# Patient Record
Sex: MALE | Race: OTHER | HISPANIC OR LATINO | URBAN - METROPOLITAN AREA
[De-identification: names, ages, dates, MRNs, and addresses within clinical notes are randomized per-mention and may not be internally consistent; named-entity substitution may affect disease eponyms.]

---

## 2024-03-11 ENCOUNTER — EMERGENCY (EMERGENCY)
Facility: HOSPITAL | Age: 24
LOS: 0 days | Discharge: HOME | End: 2024-03-11
Attending: EMERGENCY MEDICINE
Payer: COMMERCIAL

## 2024-03-11 VITALS
SYSTOLIC BLOOD PRESSURE: 146 MMHG | RESPIRATION RATE: 18 BRPM | TEMPERATURE: 98 F | DIASTOLIC BLOOD PRESSURE: 69 MMHG | HEART RATE: 77 BPM | OXYGEN SATURATION: 97 %

## 2024-03-11 VITALS
WEIGHT: 160.06 LBS | OXYGEN SATURATION: 98 % | HEART RATE: 83 BPM | RESPIRATION RATE: 18 BRPM | SYSTOLIC BLOOD PRESSURE: 134 MMHG | DIASTOLIC BLOOD PRESSURE: 72 MMHG | TEMPERATURE: 100 F | HEIGHT: 66 IN

## 2024-03-11 DIAGNOSIS — S02.401A MAXILLARY FRACTURE, UNSPECIFIED SIDE, INITIAL ENCOUNTER FOR CLOSED FRACTURE: ICD-10-CM
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
## 2024-03-11 DIAGNOSIS — J34.89 OTHER SPECIFIED DISORDERS OF NOSE AND NASAL SINUSES: ICD-10-CM
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
## 2024-03-11 DIAGNOSIS — R51.9 HEADACHE, UNSPECIFIED: ICD-10-CM

## 2024-03-11 PROCEDURE — 99284 EMERGENCY DEPT VISIT MOD MDM: CPT | Mod: 25

## 2024-03-11 PROCEDURE — 76376 3D RENDER W/INTRP POSTPROCES: CPT | Mod: 26

## 2024-03-11 PROCEDURE — 99284 EMERGENCY DEPT VISIT MOD MDM: CPT

## 2024-03-11 PROCEDURE — 70486 CT MAXILLOFACIAL W/O DYE: CPT | Mod: MC

## 2024-03-11 PROCEDURE — 70486 CT MAXILLOFACIAL W/O DYE: CPT | Mod: 26,MC

## 2024-03-11 PROCEDURE — 76376 3D RENDER W/INTRP POSTPROCES: CPT

## 2024-03-11 RX ORDER — ACETAMINOPHEN 500 MG
1000 TABLET ORAL ONCE
Refills: 0 | Status: COMPLETED | OUTPATIENT
Start: 2024-03-11 | End: 2024-03-11

## 2024-03-11 RX ADMIN — Medication 1000 MILLIGRAM(S): at 18:03

## 2024-03-11 NOTE — ED STATDOCS - PATIENT PORTAL LINK FT
You can access the FollowMyHealth Patient Portal offered by [facility] by registering at the following website: [email or web address]. By joining [facility] FollowMyHealth portal, you will also be able to view your health information using other applications (apps) compatible with our system.
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
## 2024-03-11 NOTE — ED STATDOCS - CARE PROVIDER_API CALL
Kendell Preciado  Oral/Maxillofacial Surgery  [address], [address]  [place] [address]  Phone: [phone]  Fax: [phone]  Follow Up Time:     Giuseppe Carpenter  Otolaryngology  [address], [address]  [place] [address]  Phone: [phone]  Fax: [phone]  Follow Up Time:
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
## 2024-03-11 NOTE — ED STATDOCS - NSFOLLOWUPINSTRUCTIONS_ED_ALL_ED_FT
Fractura maxilofacial  Maxillofacial Fracture  Jason fractura maxilofacial, también llamada fractura de la parte media de la luly, es la ruptura de los huesos de la parte media de la luly que polo la mandíbula superior (maxilar superior). Estos huesos incluyen:  El maxilar superior.  Los pómulos.  El borde inferior y el suelo de la cavidad ocular (suelo inferior u orbitario).  La abertura de las fosas nasales (cavidad nasal).  A veces, las fracturas maxilofaciales involucran múltiples huesos faciales, también llamadas fracturas complejas, y pueden desprenderse parcial o completamente del cráneo (fracturas de [place]). Las fracturas de [place] pueden ser muy graves y potencialmente mortales.    ¿Cuáles son las causas?  Las fracturas maxilofaciales suelen deberse a un traumatismo gabby y cerrado en la maycol media de la luly. Las causas pueden incluir las siguientes:  Accidentes automovilísticos.  Accidentes por deportes de contacto.  Deportes de combate o artes marciales.  Lesiones en deportes que utilizan pelotas duras o ramirez.  Caídas.  Accidentes en el lugar de trabajo.  Agresiones violentas.  ¿Cuáles son los signos o los síntomas?  Los síntomas de esta afección incluyen:  Hinchazón, moretones y dolor o sensibilidad en la luly.  Sangrado o coágulos de skinny en la nariz o la boca.  Drenaje transparente de la nariz.  Un cambio en el aspecto de la luly (deformidad facial) y entumecimiento.  Un cambio en la forma en que los dientes se unen (maloclusión).  Incapacidad para cerrar la boca.  Visión doble, visión borrosa o incapacidad para  el iron afectado normalmente.  Sangrado en el revestimiento del párpado o la maycol louie del iron (hemorragia subconjuntival).  Dificultad para respirar, tragar o hablar.  ¿Cómo se diagnostica?  Esta afección se puede diagnosticar en función de los síntomas y de un examen físico. El examen implica controlar lo siguiente:  Obstrucción de las vías respiratorias y cualquier sangrado potencialmente mortal.  Deformidad facial, aniya jason hendidura o depresión en la mandíbula superior que se puede sentir o , o ensanchamiento y aplanamiento de la luly.  Problemas de visión.  Entumecimiento o parálisis de los músculos oculares o faciales, o ambos.  Daño en los dientes y en el interior de la boca y la nariz.  También pueden hacerle estudios, aniya radiografías o exploración por tomografía computarizada (TC), para confirmar el diagnóstico y determinar la gravedad de la fractura.    ¿Cómo se trata?  Tratamiento temprano    El tratamiento depende de la gravedad de la fractura y de donde se encuentre. El tratamiento puede comenzar en la pa de emergencias para tener la seguridad de que los coágulos de skinny o la hinchazón no obstruyan la respiración. Los tratamientos pueden incluir lo siguiente:  Tubo endotraqueal. Se trata de un tubo de respiración que puede introducirse a través de la nariz hasta la tráquea.  Traqueostomía. Aminta procedimiento implica hacer jason abertura en la parte inferior de la tráquea para colocar un tubo de respiración.  Antibióticos, analgésicos y medicamentos para reducir la hinchazón.  Tratamientos específicos para el tipo de fractura.  Fijación maxilomandibular    En la mayoría de los casos, esta afección puede requerir un procedimiento para colocar alambres en los dientes superiores y los inferiores (fijación maxilomandibular). Es posible que necesite que le conecten los dientes mediante alambres luis felipe varias semanas para estabilizar la fractura luis felipe el tiempo de curación. Para fracturas menores, aminta puede ser el único tratamiento necesario.    Las fracturas que estén fuera de lugar (desplazadas) o inestables pueden requerir jason reducción abierta. Se trata de jason cirugía para volver a colocar los huesos rotos en hernandez lugar y luego sostenerlos con placas y tornillos o alambres.    Siga estas instrucciones en hernandez casa:  Medicamentos    Use los medicamentos de venta jeni y los recetados solamente aniya te lo haya indicado el médico.  Deville el antibiótico aniya se lo haya indicado el médico. No deje de daniel el antibiótico aunque comience a sentirse mejor.  Cuidado de la fijación maxilomandibular    Si se zavaleta unido los dientes con alambres:  Siga las instrucciones para cuidar la boca y los dientes (higiene bucal).  Asegúrese de saber qué hacer si vomita. Es posible que le den un cortador de alambre para cortar los alambres que se salgan de los dientes.  Siga las instrucciones del médico respecto de las restricciones en las comidas o las bebidas. Tendrá que seguir jason dieta con líquidos y purés mientras los dientes están unidos con alambres.  Cuidado de la incisión    Two stitched incisions. One is normal. The other is red with pus and infected.  Si tiene incisiones faciales, siga las instrucciones del médico acerca del cuidado de las incisiones. Asegúrese de hacer lo siguiente:  Lávese las rosa con agua y jabón luis felipe al menos 20 segundos antes y después de cambiarse la venda (vendaje). Use desinfectante para rosa si no dispone de agua y jabón.  Cambie el vendaje aniya se lo haya indicado el médico.  No retire los puntos (suturas), la goma para cerrar la piel o las tiras adhesivas. Es posible que estos cierres cutáneos deban quedar puestos en la piel luis felipe 2 semanas o más tiempo. Si los bordes de las tiras adhesivas empiezan a despegarse y enroscarse, puede recortar los que estén sueltos. No retire las tiras adhesivas por completo a menos que el médico se lo indique.  Controle la maycol de la incisión todos los días para detectar signos de infección. Esté atento a los siguientes signos:  Aumento del enrojecimiento, la hinchazón o el dolor.  Más líquido o skinyn.  Calor.  Pus o mal olor.  Control del dolor, la rigidez y la hinchazón    A bag of ice on a towel on the skin.   Si se lo indican, aplique hielo sobre la maycol afectada. Para hacer esto:  Ponga el hielo en jason bolsa plástica.  Coloque jason toalla entre la piel y la bolsa.  Aplique el hielo luis felipe 20 minutos, 2 a 3 veces por día.  Retire el hielo si la piel se pone de color gilbert brillante. [place] es muy importante. Si no puede sentir dolor, calor o frío, tiene un mayor riesgo de que se dañe la maycol.  Mientras esté sentado o acostado, levante (eleve) la astrid por encima del nivel del corazón.  Instrucciones generales    No tome remberto de inmersión, no nade ni use el jacuzzi hasta que el médico lo autorice. Pregúntele al médico si puede ducharse. Amor vez solo le permitan darse remberto de esponja.  Retome rafael actividades normales según lo indicado por el médico. Pregúntele al médico qué actividades son seguras para usted.  No consuma ningún producto que contenga nicotina o tabaco. Estos productos incluyen cigarrillos, tabaco para mascar y aparatos de vapeo, aniya los cigarrillos electrónicos. Si necesita ayuda para dejar de fumar, consulte al médico.  No martinez alcohol.  No levante ningún objeto que pese más de 10 libras (4.5 kg) o que supere el límite de peso que le hayan indicado, hasta que el médico le diga que puede hacerlo.  Concurra a todas las visitas de seguimiento. [place] es importante. [place] incluye visitas para que le retiren las suturas y le corten los alambres de los dientes.  Comuníquese con un médico si:  Tiene escalofríos o fiebre.  Nota signos de infección. Estos incluyen enrojecimiento, aumento del dolor o secreción con mal olor.  El dolor no se tay con los medicamentos.  Solicite ayuda de inmediato si:  Tiene dificultad para respirar.  Tiene un aumento repentino de la hinchazón.  Debe cortar los alambres de los dientes debido a los vómitos.  Estos síntomas pueden representar un problema grave que constituye jason emergencia. No espere a deo si los síntomas desaparecen. Solicite atención médica de inmediato. Comuníquese con el servicio de emergencias de hernandez localidad (911 en los [place]). No conduzca por rafael propios medios hasta el hospital.    Resumen  Jason fractura maxilofacial es la ruptura de los huesos de la parte media de la luly. Esta lesión suele ser causada por un traumatismo gabby y cerrado en la maycol media de la luly. Las fracturas en estos huesos pueden interferir con la respiración, la visión, la masticación y el habla. Las fracturas maxilofaciales pueden ser muy graves.  El tratamiento de las fracturas maxilofaciales depende de la gravedad y de la ubicación de la fractura. El tratamiento puede comenzar en la pa de emergencias para tener la seguridad de que los coágulos de skinny o la hinchazón no obstruyan la respiración.  En la mayoría de los casos, esta afección puede requerir un procedimiento para colocar alambres en los dientes superiores y los inferiores (fijación maxilomandibular).  Las fracturas graves pueden requerir un procedimiento quirúrgico (reducción abierta) para volver a colocar los huesos rotos en hernandez lugar y colocar placas y tornillos, o alambres.  Siga todas las instrucciones de cuidado en el hogar y asista a todas las visitas de seguimiento.  Esta información no tiene aniya fin reemplazar el consejo del médico. Asegúrese de hacerle al médico cualquier pregunta que tenga.

## 2024-03-11 NOTE — ED STATDOCS - ENMT, MLM
tenderness over L maxillary sinus, dried blood in b/l nares, no septal hematoma, mild tenderness over bridge of nose, no malocclusion, Mouth with normal mucosa  Throat has no vesicles, no oropharyngeal exudates and uvula is midline.
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
